# Patient Record
Sex: MALE | Race: BLACK OR AFRICAN AMERICAN | NOT HISPANIC OR LATINO | Employment: FULL TIME | URBAN - METROPOLITAN AREA
[De-identification: names, ages, dates, MRNs, and addresses within clinical notes are randomized per-mention and may not be internally consistent; named-entity substitution may affect disease eponyms.]

---

## 2022-02-25 ENCOUNTER — OFFICE VISIT (OUTPATIENT)
Dept: FAMILY MEDICINE CLINIC | Facility: CLINIC | Age: 22
End: 2022-02-25

## 2022-02-25 VITALS
SYSTOLIC BLOOD PRESSURE: 128 MMHG | WEIGHT: 271 LBS | BODY MASS INDEX: 36.7 KG/M2 | DIASTOLIC BLOOD PRESSURE: 76 MMHG | TEMPERATURE: 99.3 F | OXYGEN SATURATION: 97 % | HEIGHT: 72 IN | HEART RATE: 88 BPM

## 2022-02-25 DIAGNOSIS — M25.561 ACUTE PAIN OF RIGHT KNEE: ICD-10-CM

## 2022-02-25 DIAGNOSIS — S89.91XA INJURY OF RIGHT KNEE, INITIAL ENCOUNTER: Primary | ICD-10-CM

## 2022-02-25 DIAGNOSIS — Z76.89 ENCOUNTER TO ESTABLISH CARE: ICD-10-CM

## 2022-02-25 PROCEDURE — 99203 OFFICE O/P NEW LOW 30 MIN: CPT | Performed by: NURSE PRACTITIONER

## 2022-02-25 RX ORDER — IBUPROFEN 800 MG/1
800 TABLET ORAL EVERY 6 HOURS PRN
Qty: 60 TABLET | Refills: 0 | Status: SHIPPED | OUTPATIENT
Start: 2022-02-25

## 2022-02-25 RX ORDER — IBUPROFEN 200 MG
200 TABLET ORAL EVERY 6 HOURS PRN
COMMUNITY
End: 2022-02-25

## 2022-02-25 NOTE — PROGRESS NOTES
"Chief Complaint  new pcp and knee jerked getting out of chair month ago    Subjective          Marjorie Bailey presents to [facility]  History of Present Illness   21 yr old male presenting to establish care with complaints of right knee pain, pain increased with bending or straightening of knee, describes pain as stiffness  tearing sensation, states was getting up out of rolling chair, it malfunction and he turned quickly twisting leg, he was seen at [facility] on 01/31/2022, x-ray of right knee was negative for fracture, dislocation, or joint effusion, he was then seen on 02/10/2022 at [facility].  He has tried over-the-counter ibuprofen 400 mg, ice therapy and use of knee brace with minimal relief, will obtain MRI to r/o meniscus tear.     Objective   Vital Signs:   /76   Pulse 88   Temp 99.3 °F (37.4 °C)   Ht 182.9 cm (72\")   Wt 123 kg (271 lb)   SpO2 97%   BMI 36.75 kg/m²     Physical Exam  HENT:      Head: Normocephalic.   Cardiovascular:      Rate and Rhythm: Normal rate and regular rhythm.      Heart sounds: Normal heart sounds.   Pulmonary:      Effort: Pulmonary effort is normal.      Breath sounds: Normal breath sounds.   Musculoskeletal:      Right knee: Tenderness present over the medial joint line.   Neurological:      Mental Status: He is alert and oriented to person, place, and time.   Psychiatric:         Behavior: Behavior is cooperative.        Result Review :                 Assessment and Plan    Diagnoses and all orders for this visit:    1. Injury of right knee, initial encounter (Primary)  -     MRI Knee Right Without Contrast; Future    2. Acute pain of right knee  -     MRI Knee Right Without Contrast; Future  -     ibuprofen (ADVIL,MOTRIN) 800 MG tablet; Take 1 tablet by mouth Every 6 (Six) Hours As Needed for Mild Pain .  Dispense: 60 tablet; Refill: 0    3. Encounter to establish care      I spent 20 minutes caring for Marjorie on this " date of service. This time includes time spent by me in the following activities:reviewing tests, obtaining and/or reviewing a separately obtained history, performing a medically appropriate examination and/or evaluation , counseling and educating the patient/family/caregiver, ordering medications, tests, or procedures and documenting information in the medical record  Follow Up   Return if symptoms worsen or fail to improve.  Patient was given instructions and counseling regarding his condition or for health maintenance advice. Please see specific information pulled into the AVS if appropriate.

## 2022-02-25 NOTE — PATIENT INSTRUCTIONS
Acute Knee Pain, Adult  Acute knee pain is sudden and may be caused by damage, swelling, or irritation of the muscles and tissues that support the knee. Pain may result from:  · A fall.  · An injury to the knee from twisting motions.  · A hit to the knee.  · Infection.  Acute knee pain may go away on its own with time and rest. If it does not, your health care provider may order tests to find the cause of the pain. These may include:  · Imaging tests, such as an X-ray, MRI, CT scan, or ultrasound.  · Joint aspiration. In this test, fluid is removed from the knee and evaluated.  · Arthroscopy. In this test, a lighted tube is inserted into the knee and an image is projected onto a TV screen.  · Biopsy. In this test, a sample of tissue is removed from the body and studied under a microscope.  Follow these instructions at home:  If you have a knee sleeve or brace:    · Wear the knee sleeve or brace as told by your health care provider. Remove it only as told by your health care provider.  · Loosen it if your toes tingle, become numb, or turn cold and blue.  · Keep it clean.  · If the knee sleeve or brace is not waterproof:  ? Do not let it get wet.  ? Cover it with a watertight covering when you take a bath or shower.    Activity  · Rest your knee.  · Do not do things that cause pain or make pain worse.  · Avoid high-impact activities or exercises, such as running, jumping rope, or doing jumping jacks.  · Work with a physical therapist to make a safe exercise program, as recommended by your health care provider. Do exercises as told by your physical therapist.  Managing pain, stiffness, and swelling    · If directed, put ice on the affected knee. To do this:  ? If you have a removable knee sleeve or brace, remove it as told by your health care provider.  ? Put ice in a plastic bag.  ? Place a towel between your skin and the bag.  ? Leave the ice on for 20 minutes, 2-3 times a day.  ? Remove the ice if your skin turns  bright red. This is very important. If you cannot feel pain, heat, or cold, you have a greater risk of damage to the area.  · If directed, use an elastic bandage to put pressure (compression) on your injured knee. This may control swelling, give support, and help with discomfort.  · Raise (elevate) your knee above the level of your heart while you are sitting or lying down.  · Sleep with a pillow under your knee.    General instructions  · Take over-the-counter and prescription medicines only as told by your health care provider.  · Do not use any products that contain nicotine or tobacco, such as cigarettes, e-cigarettes, and chewing tobacco. If you need help quitting, ask your health care provider.  · If you are overweight, work with your health care provider and a dietitian to set a weight-loss goal that is healthy and reasonable for you. Extra weight can put pressure on your knee.  · Pay attention to any changes in your symptoms.  · Keep all follow-up visits. This is important.  Contact a health care provider if:  · Your knee pain continues, changes, or gets worse.  · You have a fever along with knee pain.  · Your knee feels warm to the touch or is red.  · Your knee jennifer or locks up.  Get help right away if:  · Your knee swells, and the swelling becomes worse.  · You cannot move your knee.  · You have severe pain in your knee that cannot be managed with pain medicine.  Summary  · Acute knee pain can be caused by a fall, an injury, an infection, or damage, swelling, or irritation of the tissues that support your knee.  · Your health care provider may perform tests to find out the cause of the pain.  · Pay attention to any changes in your symptoms. Relieve your pain with rest, medicines, light activity, and the use of ice.  · Get help right away if your knee swells, you cannot move your knee, or you have severe pain that cannot be managed with medicine.  This information is not intended to replace advice given  to you by your health care provider. Make sure you discuss any questions you have with your health care provider.  Document Revised: 06/02/2021 Document Reviewed: 06/02/2021  [employer] Patient Education © 2021 [employer]  Ibuprofen Oral Tablets and Capsules  What is this medicine?  IBUPROFEN (eye BYOO proe fen) is a non-steroidal anti-inflammatory drug, also known as an NSAID. It treats pain, inflammation, and swelling. It also reduces fever and minor aches and pains caused by the cold, flu, or a sore throat.  This medicine may be used for other purposes; ask your health care provider or pharmacist if you have questions.  COMMON BRAND NAME(S): Advil, Advil Seth Strength, Advil Migraine, Genpril, Ibren, IBU, Ibupak, Midol, Midol Cramps and Body Aches, Motrin, Motrin IB, Motrin Seth Strength, Motrin Migraine Pain, Mauro-8, Toxicology Saliva Collection  What should I tell my health care provider before I take this medicine?  They need to know if you have any of these conditions:  · bleeding disorder  · coronary artery bypass graft (CABG) within the past 2 weeks  · dehydration  · diarrhea  · heart attack  · heart disease  · heart failure  · high blood pressure  · if you often drink alcohol  · kidney disease  · liver disease  · lung or breathing disease (asthma)  · receiving steroids like dexamethasone or prednisone  · smoke tobacco cigarettes  · stomach bleeding  · stomach ulcers, other stomach or intestine problems  · stroke  · take drugs that treat or prevent blood clots  · vomiting  · an unusual or allergic reaction to ibuprofen, other medicines, foods, dyes, or preservatives  · pregnant or trying to get pregnant  · breast-feeding  How should I use this medicine?  Take this drug by mouth. Take it as directed on the label. You can take it with or without food. If it upsets your stomach, take it with food.  Talk to your health care provider about the use of this drug in children. While it may be prescribed for  children as young as 12 for selected conditions, precautions do apply.  Patients over 65 years of age may have a stronger reaction and need a smaller dose.  If you get this drug as a prescription, a special MedGuide will be given to you by the pharmacist with each prescription and refill. Be sure to read this information carefully each time.  Overdosage: If you think you have taken too much of this medicine contact a poison control center or emergency room at once.  NOTE: This medicine is only for you. Do not share this medicine with others.  What if I miss a dose?  If you take this drug on a regular basis, take it as soon as you can. If it is almost time for your next dose, take only that dose. Do not take double or extra doses.  What may interact with this medicine?  Do not take this medicine with any of the following medications:  · cidofovir  · ketorolac  · methotrexate  · pemetrexed  This medicine may also interact with the following medications:  · alcohol  · aspirin  · diuretics  · lithium  · other drugs for inflammation like prednisone  · warfarin  This list may not describe all possible interactions. Give your health care provider a list of all the medicines, herbs, non-prescription drugs, or dietary supplements you use. Also tell them if you smoke, drink alcohol, or use illegal drugs. Some items may interact with your medicine.  What should I watch for while using this medicine?  Visit your health care provider for regular checks on your progress. Tell your health care provider if your symptoms do not start to get better or if they get worse.  A painful sore throat or sore throat with high fevers, headaches, nausea, or vomiting may be signs of a serious infection. Call your health care provider if these symptoms occur. Do not use this medicine for more than 2 days or give to children under 3 years of age unless your health care provider tells you to.  Do not take other medicines that contain aspirin,  ibuprofen, or naproxen with this medicine. Side effects such as stomach upset, nausea, or ulcers may be more likely to occur. Many non-prescription medicines contain aspirin, ibuprofen, or naproxen. Always read labels carefully.  This medicine can cause serious ulcers and bleeding in the stomach. It can happen with no warning. Smoking, drinking alcohol, older age, and poor health can also increase risks. Call your health care provider right away if you have stomach pain or blood in your vomit or stool.  This medicine does not prevent a heart attack or stroke. This medicine may increase the chance of a heart attack or stroke. The chance may increase the longer you use this medicine or if you have heart disease. If you take aspirin to prevent a heart attack or stroke, talk to your health care provider about using this medicine.  Alcohol may interfere with the effect of this medicine. Avoid alcoholic drinks.  This medicine may cause serious skin reactions. They can happen weeks to months after starting the medicine. Contact your health care provider right away if you notice fevers or flu-like symptoms with a rash. The rash may be red or purple and then turn into blisters or peeling of the skin. Or, you might notice a red rash with swelling of the face, lips or lymph nodes in your neck or under your arms.  Talk to your health care provider if you are pregnant before taking this medicine. Taking this medicine between weeks 20 and 30 of pregnancy may harm your unborn baby. Your health care provider will monitor you closely if you need to take it. After 30 weeks of pregnancy, do not take this medicine.  You may get drowsy or dizzy. Do not drive, use machinery, or do anything that needs mental alertness until you know how this medicine affects you. Do not stand up or sit up quickly, especially if you are an older patient. This reduces the risk of dizzy or fainting spells.  Be careful brushing or flossing your teeth or using  a toothpick because you may get an infection or bleed more easily. If you have any dental work done, tell your dentist you are receiving this medicine.  This medicine may make it more difficult to get pregnant. Talk to your health care provider if you are concerned about your fertility.  What side effects may I notice from receiving this medicine?  Side effects that you should report to your doctor or health care provider as soon as possible:  · allergic reactions (skin rash, itching or hives; swelling of the face, lips, or tongue)  · aseptic meningitis (stiff neck; sensitivity to light; headache; drowsiness; fever; nausea, vomiting; rash)  · bleeding (bloody or black, tarry stools; red or dark brown urine; spitting up blood or brown material that looks like coffee grounds; red spots on the skin; unusual bruising or bleeding from the eyes, gums, or nose)  · blurred vision OR changes in vision  · heart attack (trouble breathing; pain or tightness in the chest, neck, back or arms; unusually weak or tired)  · heart failure (trouble breathing; fast, irregular heartbeat; sudden weight gain; swelling of the ankles, feet, hands; unusually weak or tired)  · high potassium levels (chest pain; fast, irregular heartbeat; muscle weakness)  · increase in blood pressure  · infection (fever, chills, cough, sore throat, pain or trouble passing urine)  · kidney injury (trouble passing urine or change in the amount of urine)  · liver injury (dark yellow or brown urine; general ill feeling or flu-like symptoms; loss of appetite, right upper belly pain; unusually weak or tired, yellowing of the eyes or skin)  · low blood pressure (dizziness; feeling faint or lightheaded, falls; unusually weak or tired)  · low red blood cell counts (trouble breathing; feeling faint; lightheaded, falls; unusually weak or tired)  · rash, fever, and swollen lymph nodes  · redness, blistering, peeling, or loosening of the skin, including inside the  mouth  · stroke (changes in vision; confusion; trouble speaking or understanding; severe headaches; sudden numbness or weakness of the face, arm or leg; trouble walking; dizziness; loss of balance or coordination)  Side effects that usually do not require medical attention (report to your doctor or health care provider if they continue or are bothersome):  · cough  · constipation  · diarrhea  · dizziness  · headache  · upset stomach  · vomiting  This list may not describe all possible side effects. Call your doctor for medical advice about side effects. You may report side effects to FDA at 4-955-UBR-2823.  Where should I keep my medicine?  Keep out of the reach of children and pets.  Store at room temperature between 20 and 25 degrees C (68 and 77 degrees F).  Get rid of any unused medicine after the expiration date.  To get rid of medicines that are no longer needed or have :  · Take the medicine to a medicine take-back program. Check with your pharmacy or law enforcement to find a location.  · If you cannot return the medicine, check the label or package insert to see if the medicine should be thrown out in the garbage or flushed down the toilet. If you are not sure, ask your health care provider. If it is safe to put it in the trash, empty the medicine out of the container. Mix the medicine with cat litter, dirt, coffee grounds, or other unwanted substance. Seal the mixture in a bag or container. Put it in the trash.  NOTE: This sheet is a summary. It may not cover all possible information. If you have questions about this medicine, talk to your doctor, pharmacist, or health care provider.  ©  Elsevier/Gold Standard (2021 12:06:36)

## 2022-03-06 ENCOUNTER — HOSPITAL ENCOUNTER (OUTPATIENT)
Dept: MRI IMAGING | Facility: HOSPITAL | Age: 22
Discharge: HOME | End: 2022-03-06
Admitting: NURSE PRACTITIONER

## 2022-03-06 DIAGNOSIS — M25.561 ACUTE PAIN OF RIGHT KNEE: ICD-10-CM

## 2022-03-06 DIAGNOSIS — S89.91XA INJURY OF RIGHT KNEE, INITIAL ENCOUNTER: ICD-10-CM

## 2022-03-06 PROCEDURE — 73721 MRI JNT OF LWR EXTRE W/O DYE: CPT

## 2022-03-09 ENCOUNTER — TELEPHONE (OUTPATIENT)
Dept: FAMILY MEDICINE CLINIC | Facility: CLINIC | Age: 22
End: 2022-03-09

## 2022-03-09 DIAGNOSIS — S83.211A BUCKET-HANDLE TEAR OF MEDIAL MENISCUS OF RIGHT KNEE AS CURRENT INJURY, INITIAL ENCOUNTER: Primary | ICD-10-CM

## 2022-03-09 NOTE — TELEPHONE ENCOUNTER
Caller: Marjorie Bailey    Relationship: Self    Best call back number: [phone]    What was the call regarding: PLEASE CALL REGARDING MRI RESULTS    Do you require a callback: YES

## 2022-03-28 ENCOUNTER — OFFICE VISIT (OUTPATIENT)
Dept: ORTHOPEDIC SURGERY | Facility: CLINIC | Age: 22
End: 2022-03-28

## 2022-03-28 VITALS — TEMPERATURE: 96.2 F | WEIGHT: 273.6 LBS | HEIGHT: 72 IN | BODY MASS INDEX: 37.06 KG/M2

## 2022-03-28 DIAGNOSIS — R52 PAIN: Primary | ICD-10-CM

## 2022-03-28 DIAGNOSIS — S83.211A BUCKET-HANDLE TEAR OF MEDIAL MENISCUS OF RIGHT KNEE AS CURRENT INJURY, INITIAL ENCOUNTER: ICD-10-CM

## 2022-03-28 PROCEDURE — 73562 X-RAY EXAM OF KNEE 3: CPT | Performed by: ORTHOPAEDIC SURGERY

## 2022-03-28 PROCEDURE — 99204 OFFICE O/P NEW MOD 45 MIN: CPT | Performed by: ORTHOPAEDIC SURGERY

## 2022-03-28 RX ORDER — CEFAZOLIN SODIUM 2 G/100ML
2 INJECTION, SOLUTION INTRAVENOUS ONCE
Status: CANCELLED | OUTPATIENT
Start: 2022-04-11 | End: 2022-03-28

## 2022-03-29 PROBLEM — S83.211A BUCKET-HANDLE TEAR OF MEDIAL MENISCUS OF RIGHT KNEE AS CURRENT INJURY: Status: ACTIVE | Noted: 2022-03-29

## 2022-04-08 ENCOUNTER — LAB (OUTPATIENT)
Dept: LAB | Facility: HOSPITAL | Age: 22
End: 2022-04-08

## 2022-04-08 DIAGNOSIS — S83.211A BUCKET-HANDLE TEAR OF MEDIAL MENISCUS OF RIGHT KNEE AS CURRENT INJURY, INITIAL ENCOUNTER: ICD-10-CM

## 2022-04-08 LAB — SARS-COV-2 ORF1AB RESP QL NAA+PROBE: NOT DETECTED

## 2022-04-08 PROCEDURE — U0004 COV-19 TEST NON-CDC HGH THRU: HCPCS

## 2022-04-08 PROCEDURE — C9803 HOPD COVID-19 SPEC COLLECT: HCPCS

## 2022-04-11 ENCOUNTER — ANESTHESIA (OUTPATIENT)
Dept: PERIOP | Facility: HOSPITAL | Age: 22
End: 2022-04-11

## 2022-04-11 ENCOUNTER — HOSPITAL ENCOUNTER (OUTPATIENT)
Facility: HOSPITAL | Age: 22
Setting detail: HOSPITAL OUTPATIENT SURGERY
Discharge: HOME | End: 2022-04-11
Attending: ORTHOPAEDIC SURGERY | Admitting: ORTHOPAEDIC SURGERY

## 2022-04-11 ENCOUNTER — ANESTHESIA EVENT (OUTPATIENT)
Dept: PERIOP | Facility: HOSPITAL | Age: 22
End: 2022-04-11

## 2022-04-11 VITALS
OXYGEN SATURATION: 98 % | DIASTOLIC BLOOD PRESSURE: 92 MMHG | SYSTOLIC BLOOD PRESSURE: 121 MMHG | TEMPERATURE: 98.8 F | HEART RATE: 56 BPM | RESPIRATION RATE: 18 BRPM

## 2022-04-11 DIAGNOSIS — S83.211A BUCKET-HANDLE TEAR OF MEDIAL MENISCUS OF RIGHT KNEE AS CURRENT INJURY, INITIAL ENCOUNTER: ICD-10-CM

## 2022-04-11 PROCEDURE — 25010000002 HYDROMORPHONE PER 4 MG: Performed by: NURSE ANESTHETIST, CERTIFIED REGISTERED

## 2022-04-11 PROCEDURE — 25010000002 DEXAMETHASONE PER 1 MG: Performed by: NURSE ANESTHETIST, CERTIFIED REGISTERED

## 2022-04-11 PROCEDURE — 29881 ARTHRS KNE SRG MNISECTMY M/L: CPT | Performed by: ORTHOPAEDIC SURGERY

## 2022-04-11 PROCEDURE — 25010000002 METHYLPREDNISOLONE PER 80 MG: Performed by: ORTHOPAEDIC SURGERY

## 2022-04-11 PROCEDURE — 25010000002 PROPOFOL 10 MG/ML EMULSION: Performed by: NURSE ANESTHETIST, CERTIFIED REGISTERED

## 2022-04-11 PROCEDURE — 25010000002 ONDANSETRON PER 1 MG: Performed by: NURSE ANESTHETIST, CERTIFIED REGISTERED

## 2022-04-11 PROCEDURE — 25010000002 CEFAZOLIN IN DEXTROSE 2-4 GM/100ML-% SOLUTION: Performed by: ORTHOPAEDIC SURGERY

## 2022-04-11 PROCEDURE — 25010000002 FENTANYL CITRATE (PF) 50 MCG/ML SOLUTION: Performed by: NURSE ANESTHETIST, CERTIFIED REGISTERED

## 2022-04-11 PROCEDURE — 25010000002 KETOROLAC TROMETHAMINE PER 15 MG: Performed by: NURSE ANESTHETIST, CERTIFIED REGISTERED

## 2022-04-11 RX ORDER — ONDANSETRON 2 MG/ML
INJECTION INTRAMUSCULAR; INTRAVENOUS AS NEEDED
Status: DISCONTINUED | OUTPATIENT
Start: 2022-04-11 | End: 2022-04-11 | Stop reason: SURG

## 2022-04-11 RX ORDER — MIDAZOLAM HYDROCHLORIDE 1 MG/ML
1 INJECTION INTRAMUSCULAR; INTRAVENOUS
Status: DISCONTINUED | OUTPATIENT
Start: 2022-04-11 | End: 2022-04-11 | Stop reason: HOSPADM

## 2022-04-11 RX ORDER — HYDROCODONE BITARTRATE AND ACETAMINOPHEN 5; 325 MG/1; MG/1
1 TABLET ORAL ONCE AS NEEDED
Status: DISCONTINUED | OUTPATIENT
Start: 2022-04-11 | End: 2022-04-11 | Stop reason: HOSPADM

## 2022-04-11 RX ORDER — FENTANYL CITRATE 50 UG/ML
INJECTION, SOLUTION INTRAMUSCULAR; INTRAVENOUS AS NEEDED
Status: DISCONTINUED | OUTPATIENT
Start: 2022-04-11 | End: 2022-04-11 | Stop reason: SURG

## 2022-04-11 RX ORDER — HYDROCODONE BITARTRATE AND ACETAMINOPHEN 7.5; 325 MG/1; MG/1
1 TABLET ORAL EVERY 4 HOURS PRN
Status: DISCONTINUED | OUTPATIENT
Start: 2022-04-11 | End: 2022-04-11 | Stop reason: HOSPADM

## 2022-04-11 RX ORDER — PROPOFOL 10 MG/ML
VIAL (ML) INTRAVENOUS AS NEEDED
Status: DISCONTINUED | OUTPATIENT
Start: 2022-04-11 | End: 2022-04-11 | Stop reason: SURG

## 2022-04-11 RX ORDER — HYDROCODONE BITARTRATE AND ACETAMINOPHEN 5; 325 MG/1; MG/1
1 TABLET ORAL EVERY 6 HOURS PRN
Qty: 25 TABLET | Refills: 0 | Status: SHIPPED | OUTPATIENT
Start: 2022-04-11

## 2022-04-11 RX ORDER — FLUMAZENIL 0.1 MG/ML
0.2 INJECTION INTRAVENOUS AS NEEDED
Status: DISCONTINUED | OUTPATIENT
Start: 2022-04-11 | End: 2022-04-11 | Stop reason: HOSPADM

## 2022-04-11 RX ORDER — LIDOCAINE HYDROCHLORIDE 10 MG/ML
0.5 INJECTION, SOLUTION EPIDURAL; INFILTRATION; INTRACAUDAL; PERINEURAL ONCE AS NEEDED
Status: DISCONTINUED | OUTPATIENT
Start: 2022-04-11 | End: 2022-04-11 | Stop reason: HOSPADM

## 2022-04-11 RX ORDER — ACETAMINOPHEN 325 MG/1
650 TABLET ORAL ONCE
Status: COMPLETED | OUTPATIENT
Start: 2022-04-11 | End: 2022-04-11

## 2022-04-11 RX ORDER — SODIUM CHLORIDE 0.9 % (FLUSH) 0.9 %
10 SYRINGE (ML) INJECTION EVERY 12 HOURS SCHEDULED
Status: DISCONTINUED | OUTPATIENT
Start: 2022-04-11 | End: 2022-04-11 | Stop reason: HOSPADM

## 2022-04-11 RX ORDER — LIDOCAINE HYDROCHLORIDE 20 MG/ML
INJECTION, SOLUTION INFILTRATION; PERINEURAL AS NEEDED
Status: DISCONTINUED | OUTPATIENT
Start: 2022-04-11 | End: 2022-04-11 | Stop reason: SURG

## 2022-04-11 RX ORDER — CEFAZOLIN SODIUM 2 G/100ML
2 INJECTION, SOLUTION INTRAVENOUS ONCE
Status: COMPLETED | OUTPATIENT
Start: 2022-04-11 | End: 2022-04-11

## 2022-04-11 RX ORDER — HYDROMORPHONE HYDROCHLORIDE 1 MG/ML
0.5 INJECTION, SOLUTION INTRAMUSCULAR; INTRAVENOUS; SUBCUTANEOUS
Status: DISCONTINUED | OUTPATIENT
Start: 2022-04-11 | End: 2022-04-11 | Stop reason: HOSPADM

## 2022-04-11 RX ORDER — DEXMEDETOMIDINE HYDROCHLORIDE 100 UG/ML
INJECTION, SOLUTION INTRAVENOUS AS NEEDED
Status: DISCONTINUED | OUTPATIENT
Start: 2022-04-11 | End: 2022-04-11 | Stop reason: SURG

## 2022-04-11 RX ORDER — GLYCOPYRROLATE 0.2 MG/ML
0.2 INJECTION INTRAMUSCULAR; INTRAVENOUS
Status: COMPLETED | OUTPATIENT
Start: 2022-04-11 | End: 2022-04-11

## 2022-04-11 RX ORDER — SODIUM CHLORIDE, SODIUM LACTATE, POTASSIUM CHLORIDE, CALCIUM CHLORIDE 600; 310; 30; 20 MG/100ML; MG/100ML; MG/100ML; MG/100ML
9 INJECTION, SOLUTION INTRAVENOUS CONTINUOUS PRN
Status: DISCONTINUED | OUTPATIENT
Start: 2022-04-11 | End: 2022-04-11 | Stop reason: HOSPADM

## 2022-04-11 RX ORDER — ONDANSETRON 2 MG/ML
4 INJECTION INTRAMUSCULAR; INTRAVENOUS ONCE AS NEEDED
Status: DISCONTINUED | OUTPATIENT
Start: 2022-04-11 | End: 2022-04-11 | Stop reason: HOSPADM

## 2022-04-11 RX ORDER — KETOROLAC TROMETHAMINE 30 MG/ML
INJECTION, SOLUTION INTRAMUSCULAR; INTRAVENOUS AS NEEDED
Status: DISCONTINUED | OUTPATIENT
Start: 2022-04-11 | End: 2022-04-11 | Stop reason: SURG

## 2022-04-11 RX ORDER — HYDROMORPHONE HCL 110MG/55ML
PATIENT CONTROLLED ANALGESIA SYRINGE INTRAVENOUS AS NEEDED
Status: DISCONTINUED | OUTPATIENT
Start: 2022-04-11 | End: 2022-04-11 | Stop reason: SURG

## 2022-04-11 RX ORDER — SODIUM CHLORIDE, SODIUM LACTATE, POTASSIUM CHLORIDE, AND CALCIUM CHLORIDE .6; .31; .03; .02 G/100ML; G/100ML; G/100ML; G/100ML
IRRIGANT IRRIGATION AS NEEDED
Status: DISCONTINUED | OUTPATIENT
Start: 2022-04-11 | End: 2022-04-11 | Stop reason: HOSPADM

## 2022-04-11 RX ORDER — EPHEDRINE SULFATE 50 MG/ML
5 INJECTION, SOLUTION INTRAVENOUS ONCE AS NEEDED
Status: DISCONTINUED | OUTPATIENT
Start: 2022-04-11 | End: 2022-04-11 | Stop reason: HOSPADM

## 2022-04-11 RX ORDER — FENTANYL CITRATE 50 UG/ML
50 INJECTION, SOLUTION INTRAMUSCULAR; INTRAVENOUS
Status: DISCONTINUED | OUTPATIENT
Start: 2022-04-11 | End: 2022-04-11 | Stop reason: HOSPADM

## 2022-04-11 RX ORDER — DEXAMETHASONE SODIUM PHOSPHATE 10 MG/ML
INJECTION INTRAMUSCULAR; INTRAVENOUS AS NEEDED
Status: DISCONTINUED | OUTPATIENT
Start: 2022-04-11 | End: 2022-04-11 | Stop reason: SURG

## 2022-04-11 RX ORDER — SODIUM CHLORIDE 0.9 % (FLUSH) 0.9 %
10 SYRINGE (ML) INJECTION AS NEEDED
Status: DISCONTINUED | OUTPATIENT
Start: 2022-04-11 | End: 2022-04-11 | Stop reason: HOSPADM

## 2022-04-11 RX ADMIN — GLYCOPYRROLATE 0.2 MG: 0.2 INJECTION INTRAMUSCULAR; INTRAVENOUS at 08:05

## 2022-04-11 RX ADMIN — DEXMEDETOMIDINE 8 MCG: 100 INJECTION, SOLUTION, CONCENTRATE INTRAVENOUS at 10:09

## 2022-04-11 RX ADMIN — KETOROLAC TROMETHAMINE 30 MG: 30 INJECTION, SOLUTION INTRAMUSCULAR at 10:23

## 2022-04-11 RX ADMIN — PROPOFOL 230 MG: 10 INJECTION, EMULSION INTRAVENOUS at 09:43

## 2022-04-11 RX ADMIN — DEXMEDETOMIDINE 4 MCG: 100 INJECTION, SOLUTION, CONCENTRATE INTRAVENOUS at 10:23

## 2022-04-11 RX ADMIN — DEXAMETHASONE SODIUM PHOSPHATE 10 MG: 10 INJECTION INTRAMUSCULAR; INTRAVENOUS at 09:48

## 2022-04-11 RX ADMIN — FENTANYL CITRATE 50 MCG: 50 INJECTION INTRAMUSCULAR; INTRAVENOUS at 10:09

## 2022-04-11 RX ADMIN — SODIUM CHLORIDE, POTASSIUM CHLORIDE, SODIUM LACTATE AND CALCIUM CHLORIDE 9 ML/HR: 600; 310; 30; 20 INJECTION, SOLUTION INTRAVENOUS at 07:54

## 2022-04-11 RX ADMIN — ONDANSETRON 4 MG: 2 INJECTION INTRAMUSCULAR; INTRAVENOUS at 10:28

## 2022-04-11 RX ADMIN — ACETAMINOPHEN 650 MG: 325 TABLET, FILM COATED ORAL at 07:55

## 2022-04-11 RX ADMIN — HYDROMORPHONE HYDROCHLORIDE 0.5 MG: 2 INJECTION, SOLUTION INTRAMUSCULAR; INTRAVENOUS; SUBCUTANEOUS at 10:28

## 2022-04-11 RX ADMIN — FENTANYL CITRATE 25 MCG: 50 INJECTION INTRAMUSCULAR; INTRAVENOUS at 10:13

## 2022-04-11 RX ADMIN — HYDROMORPHONE HYDROCHLORIDE 0.5 MG: 2 INJECTION, SOLUTION INTRAMUSCULAR; INTRAVENOUS; SUBCUTANEOUS at 10:20

## 2022-04-11 RX ADMIN — LIDOCAINE HYDROCHLORIDE 100 MG: 20 INJECTION, SOLUTION INFILTRATION; PERINEURAL at 09:43

## 2022-04-11 RX ADMIN — FENTANYL CITRATE 25 MCG: 50 INJECTION INTRAMUSCULAR; INTRAVENOUS at 09:48

## 2022-04-11 RX ADMIN — CEFAZOLIN SODIUM 2 G: 2 INJECTION, SOLUTION INTRAVENOUS at 09:35

## 2022-04-11 RX ADMIN — DEXMEDETOMIDINE 8 MCG: 100 INJECTION, SOLUTION, CONCENTRATE INTRAVENOUS at 09:46

## 2022-04-11 RX ADMIN — DEXMEDETOMIDINE 8 MCG: 100 INJECTION, SOLUTION, CONCENTRATE INTRAVENOUS at 09:48

## 2022-04-11 NOTE — ANESTHESIA POSTPROCEDURE EVALUATION
Patient: Marjorie Bailey    Procedure Summary     Date: 04/11/22 Room / Location:  [facility]    Anesthesia Start: 0938 Anesthesia Stop: 1043    Procedure: KNEE ARTHROSCOPY  PARTIAL MEDIAL MENISECTOMY (Right Knee) Diagnosis:       Bucket-handle tear of medial meniscus of right knee as current injury, initial encounter      (Bucket-handle tear of medial meniscus of right knee as current injury, initial encounter [S83.211A])    Surgeons: Destiny Wilson MD Provider: Betito Irving MD    Anesthesia Type: general ASA Status: 3          Anesthesia Type: general    Vitals  Vitals Value Taken Time   /80 04/11/22 1216   Temp 37.1 °C (98.8 °F) 04/11/22 1215   Pulse 50 04/11/22 1225   Resp 20 04/11/22 1215   SpO2 97 % 04/11/22 1225   Vitals shown include unvalidated device data.        Post Anesthesia Care and Evaluation    Patient location during evaluation: bedside  Patient participation: complete - patient participated  Level of consciousness: awake  Pain management: adequate  Airway patency: patent  Anesthetic complications: No anesthetic complications  PONV Status: controlled  Cardiovascular status: acceptable  Respiratory status: acceptable  Hydration status: acceptable    Comments: --------------------            04/11/22               1237     --------------------   BP:       121/92     Pulse:      56       Resp:       18       Temp:                SpO2:      98%      --------------------

## 2022-04-11 NOTE — ANESTHESIA PROCEDURE NOTES
Airway  Urgency: elective    Date/Time: 4/11/2022 9:45 AM    General Information and Staff    Patient location during procedure: OR  CRNA: Clemencia Covarrubias CRNA    Indications and Patient Condition  Indications for airway management: airway protection    Preoxygenated: yes  Mask difficulty assessment: 0 - not attempted    Final Airway Details  Final airway type: supraglottic airway      Successful airway: unique  Size 5    Number of attempts at approach: 1  Assessment: lips, teeth, and gum same as pre-op and atraumatic intubation

## 2022-04-11 NOTE — ANESTHESIA PREPROCEDURE EVALUATION
Anesthesia Evaluation     Patient summary reviewed and Nursing notes reviewed   NPO Solid Status: > 8 hours             Airway   Mallampati: II  TM distance: >3 FB  Neck ROM: full  no difficulty expected  Dental - normal exam     Pulmonary - normal exam   (+) a smoker Current, asthma,  Cardiovascular - negative cardio ROS and normal exam        Neuro/Psych- negative ROS  GI/Hepatic/Renal/Endo    (+) obesity,       Musculoskeletal (-) negative ROS    Abdominal  - normal exam   Substance History - negative use     OB/GYN negative ob/gyn ROS         Other        ROS/Med Hx Other: Father  at 22 due to an MI  Patient has no cardiovascular symptoms                Anesthesia Plan    ASA 3     general   (There were no vitals filed for this visit.)  intravenous induction     Anesthetic plan, all risks, benefits, and alternatives have been provided, discussed and informed consent has been obtained with: patient.    Plan discussed with CRNA.        CODE STATUS:

## 2022-04-11 NOTE — H&P
History & Physical       Patient: Marjorie Bailey    Date of Admission: 4/11/2022  7:01 AM    YOB: 2000    Medical Record Number: [id number]    Attending Physician: Destiny Wilson MD        Chief Complaints: Bucket-handle tear of medial meniscus of right knee as current injury, initial encounter [S83.211A]      History of Present Illness: This patient has a several month history of right knee pain with loss of terminal extension for the last 3 months he has an MRI which shows a large bucket-handle tear of the medial meniscus he presents for arthroscopy.  If this is repairable we will repair it but since it has been out for 3 months the chance of that is a little less likely     Allergies: No Known Allergies    Medications:   Home Medications:  No current facility-administered medications on file prior to encounter.     Current Outpatient Medications on File Prior to Encounter   Medication Sig   • ibuprofen (ADVIL,MOTRIN) 800 MG tablet Take 1 tablet by mouth Every 6 (Six) Hours As Needed for Mild Pain .     Current Medications:  Scheduled Meds:acetaminophen, 650 mg, Oral, Once  ceFAZolin, 2 g, Intravenous, Once  sodium chloride, 10 mL, Intravenous, Q12H      Continuous Infusions:lactated ringers, 9 mL/hr      PRN Meds:.lactated ringers  •  lidocaine PF 1%  •  midazolam  •  sodium chloride    Past Medical History:   Diagnosis Date   • Allergies    • Asthma     AS A CHILD   • Right knee meniscal tear         Past Surgical History:   Procedure Laterality Date   • NO PAST SURGERIES          Social History     Occupational History   • Not on file   Tobacco Use   • Smoking status: Current Every Day Smoker     Packs/day: 0.25     Types: Cigarettes   • Smokeless tobacco: Never Used   • Tobacco comment: black and mild   Vaping Use   • Vaping Use: Never used   Substance and Sexual Activity   • Alcohol use: Not Currently     Comment: OCCASIONAL   • Drug use: Yes     Types: Marijuana     Comment: OCCASIONAL    • Sexual activity: Defer      Social History     Social History Narrative   • Not on file        Family History   Problem Relation Age of Onset   • Heart disease Father    • Sickle cell anemia Sister    • Sickle cell anemia Sister    • Malig Hyperthermia Neg Hx        Review of Systems      Physical Exam: 21 y.o. male  General Appearance:    Alert, cooperative, in no acute distress                      Vitals:    04/11/22 0721   Temp: 98.2 °F (36.8 °C)   TempSrc: Oral        Head:  Normocephalic, without obvious abnormality, atraumatic   Eyes:          Conjunctivae and sclerae normal, no pallor, corneas clear,    Ears:  Ears appear intact with no abnormalities noted   Throat: No oral lesions, no thrush, oral mucosa moist   Neck: No adenopathy, supple, trachea midline, no thyromegaly,    Back:   No kyphosis present, no scoliosis present, no skin lesions,      erythema or scars, no tenderness to percussion or                   palpation,range of motion normal   Lungs:   Clear to auscultation, respirations regular, even and                 unlabored    Heart:  Regular rhythm and normal rate               Chest Wall:  No abnormalities observed   Abdomen:   Normal bowel sounds, no masses, no organomegaly, soft    nontender, nondistended, no guarding, no rebound   tenderness   Rectal:   Deferred   Extremities:  Moves all extremities well, no edema,   no cyanosis, no redness   Pulses: Pulses palpable and equal bilaterally   Skin: No bleeding, bruising or rash   Lymph nodes: No palpable adenopathy   Neurologic: Appears neurologic intact             Assessment:  Patient Active Problem List   Diagnosis   • Bucket-handle tear of medial meniscus of right knee as current injury           Plan: All risks, benefits and alternatives were discussed.  Risks including but not exclusive to anesthetic complications, including death, MI, CVA, infection, bleeding DVT, PE,  fracture, residual pain and need for future surgery.  Patient  understood all and agrees to proceed.

## 2022-04-11 NOTE — OP NOTE
Operative Note      Facility: [facility]  Patient Name: Marjorie Bailey  YOB: 2000  Date: 4/11/2022  Medical Record Number: [id number]      Pre-op Diagnosis:   Bucket-handle tear of medial meniscus of right knee as current injury, initial encounter [S83.211A]    Post-Op Diagnosis Codes:     * Bucket-handle tear of medial meniscus of right knee as current injury, initial encounter [S83.211A]    Procedure(s):  Right KNEE ARTHROSCOPY  PARTIAL MEDIAL MENISECTOMY    Surgeon(s):  Destiny Wilson MD    Anesthesia: General  Anesthesiologist: Betito Irving MD  CRNA: Clemencia Covarrubias CRNA    Staff:   Circulator: Areli Evans RN  Scrub Person: Gisella Reilly  Vendor Representative: Teresa Enriquez  Other: Patience Barrios RN    Assistants : none      Estimated Blood Loss: 5 mL    Specimens:    none     Drains: None    Findings: See Dictation    Complications: None      Indication for procedure: This patient has had a several month history of knee pain and has an exam and an MRI which are consistent with meniscal pathology. They understand all options and wish to proceed with arthroscopy.      Description of procedure: The patient was taken to the operating room. They were placed supine on the operating room table. After induction of adequate LMA anesthesia, IV antibiotics the patient underwent exam under anesthesia with symmetric full range of motion. Nonsterile tourniquet was applied patient was placed in the thigh mitchell all prominent areas were well padded and end of the table dropped. The leg was prepped and draped in usual sterile fashion. Standard lateral incision was made with 11 blade. Blunt trocar penetrated into the joint, scope followed and the evaluation began.  The patella appeared to sit centrally within the trochlear groove the cartilage was intact on both gutters were normal then entered the medial compartment under spinal needle localization direct visualization a medial  portals established he had an obvious bucket-handle tear of the medial meniscus that had been bucketed since January.  I established a medial portal with spinal needle localization direct visualization and evaluated the meniscus.  It was bucketed but also very beat up had a complex tear with a large horizontal cleavage component as well.  I did not feel it was repairable.  I resected it from the posterior aspect and in the anterior aspect and removed it.  Inspection of it on the table demonstrated a macerated fragment.  The cartilage was intact on the femur and the tibia notch was then normal lateral compartment was normal            At this point everything was thoroughly irrigated it was suctioned all 3 compartments, the gutters the suprapatellar pouch were all evaluated there was no further acute pathology seen.  Everything was thoroughly irrigated it was injected with Marcaine Depo-Medrol.  I put some Depo-Medrol in there because he did have some synovitis associated with his pathology.  The portals were closed with 3-0 nylon interrupted fashion.  Sterile dressings and Ace wraps were applied.  The patient tolerated the procedure well and was taken to recovery room in good condition.  All sponge and needle counts were correct.                    Date: 4/11/2022  Time: 10:54 EDT

## 2022-04-25 ENCOUNTER — OFFICE VISIT (OUTPATIENT)
Dept: ORTHOPEDIC SURGERY | Facility: CLINIC | Age: 22
End: 2022-04-25

## 2022-04-25 VITALS — HEIGHT: 72 IN | TEMPERATURE: 97.5 F | WEIGHT: 279 LBS | BODY MASS INDEX: 37.79 KG/M2

## 2022-04-25 DIAGNOSIS — Z98.890 S/P ARTHROSCOPY OF KNEE: Primary | ICD-10-CM

## 2022-04-25 PROCEDURE — 99024 POSTOP FOLLOW-UP VISIT: CPT | Performed by: ORTHOPAEDIC SURGERY

## 2022-04-25 NOTE — PROGRESS NOTES
Knee Scope follow Up 1st Visit      Patient: Marjorie Bailey        YOB: 2000      Chief Complaints: knee pain right      History of Present Illness: Pt is here f/u knee arthroscopy of the right knee he states he is doing good states his knee feels much better he is progressing slowly per our request        Allergies: No Known Allergies    Medications:   Home Medications:  Current Outpatient Medications on File Prior to Visit   Medication Sig   • HYDROcodone-acetaminophen (NORCO) 5-325 MG per tablet Take 1 tablet by mouth Every 6 (Six) Hours As Needed for Severe Pain .   • ibuprofen (ADVIL,MOTRIN) 800 MG tablet Take 1 tablet by mouth Every 6 (Six) Hours As Needed for Mild Pain .     No current facility-administered medications on file prior to visit.     Current Medications:  Scheduled Meds:  Continuous Infusions:No current facility-administered medications for this visit.    PRN Meds:.          Physical Exam: 21 y.o. male  General Appearance:    Alert, cooperative, in no acute distress                 There were no vitals filed for this visit.   Patient is alert and oriented ×3 no acute distress normal mood physical exam.  Physical exam of the knee, incisions looked good there is no erythema, calf is soft and non-tender.  No sign or sx of DVT      Assessment  S/P knee scope.  I did review intraoperative findings and arthroscopic pictures with the patient.          Plan: To remove sutures today place Steri-Strips and start into  physical therapy and I will have thrm follow up in 4 weeks.

## 2022-04-27 ENCOUNTER — TREATMENT (OUTPATIENT)
Dept: PHYSICAL THERAPY | Facility: CLINIC | Age: 22
End: 2022-04-27

## 2022-04-27 DIAGNOSIS — Z47.89 ORTHOPEDIC AFTERCARE: ICD-10-CM

## 2022-04-27 DIAGNOSIS — Z98.890 S/P ARTHROSCOPIC PARTIAL MEDIAL MENISCECTOMY: Primary | ICD-10-CM

## 2022-04-27 PROCEDURE — 97110 THERAPEUTIC EXERCISES: CPT | Performed by: PHYSICAL THERAPIST

## 2022-04-27 PROCEDURE — 97162 PT EVAL MOD COMPLEX 30 MIN: CPT | Performed by: PHYSICAL THERAPIST

## 2022-04-27 PROCEDURE — 97530 THERAPEUTIC ACTIVITIES: CPT | Performed by: PHYSICAL THERAPIST

## 2022-04-27 NOTE — PROGRESS NOTES
Physical Therapy Initial Evaluation and Plan of Care    Patient: Marjorie Bailey   : 2000  Diagnosis/ICD-10 Code:  S/P arthroscopic partial medial meniscectomy [Z98.890]  Referring practitioner: Destiny Wilson MD  Date of Initial Visit: 2022  Today's Date: 2022  Patient seen for 1 session         Visit Diagnoses:    ICD-10-CM ICD-9-CM   1. S/P arthroscopic partial medial meniscectomy  Z98.890 V45.89   2. Orthopedic aftercare  Z47.89 V54.9         Subjective Questionnaire: LEFS: 38/80      Subjective Evaluation    History of Present Illness  Date of onset: 2022  Date of surgery: 2022  Mechanism of injury: 21 year old s/p scope R bucket handle tear 22 after injury on 22 getting out of a reclining chair. Positive MRI on 3-6-22 verified large bucket handle tear.   Does feel that he may have originally hurt his knee last Fall jumping 40x trying to touch a backboard.  No other prior R knee injury although active playing football in [place].   Sutures out on 22 and now with steri strips. 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 Back to shooting basketball and agrees not to play until next ortho follow up.   Does have [employer] membership and will take our advice to go back slowly.       Patient Occupation: food delivery Quality of life: excellent    Pain  Current pain ratin  At best pain ratin  At worst pain ratin  Location: lateral femoral condyle  Quality: sharp  Relieving factors: rest  Aggravating factors: ambulation  Progression: improved    Diagnostic Tests  X-ray: abnormal  MRI studies: abnormal    Treatments  No previous or current treatments  Patient Goals  Patient goals for therapy: decreased edema, decreased pain, increased motion, improved balance, increased strength and return to sport/leisure activities             Objective          Static Posture     Pelvis   Anterior pelvic tilt    Knee   Genu valgus.   Knee (Left): Recurvatum.    Knee (Right): Recurvatum.     Ankle/Foot   Ankle/Foot (Left): Pes planus.   Ankle/Foot (Right): Pes planus.     Observations     Right Knee   Positive for incision. Negative for drainage.     Additional Knee Observation Details  Band aid added back over steri strip that had come away from skin too early at this point. 2 of 3 scope incisions held well with steri strip but one area needs better closure.     Tenderness     Right Knee   Tenderness in the lateral patella and lateral retinaculum.     Active Range of Motion   Left Knee   Normal active range of motion  Flexion: 144 degrees   Extension: 0 degrees     Right Knee   Flexion: 136 degrees   Extension: 0 degrees     Additional Active Range of Motion Details  Hyperextension 5 on R and 10 degrees L    Strength/Myotome Testing     Left Knee   Normal strength  Quadriceps contraction: good    Right Knee   Flexion: 4-  Extension: 4-  Quadriceps contraction: good    Swelling     Left Knee Girth Measurement (cm)   Joint line: 39 cm    Right Knee Girth Measurement (cm)   Joint line: 41 cm    Ambulation   Weight-Bearing Status   Assistive device used: none    Ambulation: Level Surfaces   Ambulation without assistive device: independent    Ambulation: Stairs   Ascend stairs: independent  Pattern: reciprocal  Railings: without rails  Descend stairs: independent  Pattern: reciprocal  Railings: without rails    Observational Gait   Left stance time and right stance time within functional limits. Decreased walking speed.   Right foot contact pattern: heel to toe  Base of support: increased    Quality of Movement During Gait     Pelvis  Anterior pelvic tilt.     Knee    Knee (Left): Positive recurvatum and valgus.   Knee (Right): Positive recurvatum and valgus.     Ankle    Ankle (Left): Positive pronated.   Ankle (Right): Positive pronated.     Foot Alignment    Foot Alignment (Left): Positive flat foot.   Foot Alignment (Right): Positive flat foot.     Additional Quality of  "Movement During Gait Details  Severe flat feet L > R with valgus and recurvatum    Functional Assessment     Forward Step Up 6\"   Left Leg  Within functional limits.     Right Leg  Pain.     Single Leg Stance   Left: 20 seconds  Right: 30 seconds          Assessment & Plan     Assessment  Impairments: abnormal gait, abnormal or restricted ROM, activity intolerance, impaired balance, impaired physical strength, lacks appropriate home exercise program, pain with function and weight-bearing intolerance  Functional Limitations: carrying objects, lifting, walking, pulling, pushing and uncomfortable because of pain  Assessment details: Pt with stable condition happy with outcome so far. Lots of questions regarding prognosis for possible arthritis down the road. Explained need for wt loss, activities with lower load WB like biking vs. Running. Pt with co-morbidity of significant obesity at 279#, long hx tendency to  recurvatum. Pt very aware of this habit and did well adapting to new plan to soften knees and keep core tight. Extreme pes planus affecting knees and will obtain arch supports asap.  Personal factor of being able to work as needed and has done 70 hour weeks in the past. Has access to a gym and will advise him on do's and don'ts. Has already started back to squatting and advised to use TRX squats and low load leg press instead.  Pt very active and energetic and needs skilled PT to make sure he doesn't resume some activities too soon.   Prognosis: good  Prognosis details: Access Code: [email or web address]  URL: [email or web address]  Date: 04/27/2022  Prepared by: Zorre Kimura    Exercises  Supine Active Straight Leg Raise - 1 x daily - 7 x weekly - 2 sets - 10 reps - use red band hold  Sidelying Hip Abduction with Resistance at Ankle - 1 x daily - 7 x weekly - 2 sets - 10 reps - band above knee if needed hold  Sidelying Hip Adduction with Ankle Weight - Leg Behind - 1 x daily - 7 x weekly - 2 sets - 10 " reps  Prone Hip Extension with Resistance Loop - 1 x daily - 7 x weekly - 2 sets - 10 reps  Standing Hip Extension with Anchored Resistance - 1 x daily - 7 x weekly - 2 sets - 10 reps  Standing Hip Flexion with Resistance Loop - 1 x daily - 7 x weekly - 2 sets - 10 reps  Standing Terminal Knee Extension with Resistance - 1 x daily - 7 x weekly - 2 sets - 10 reps  Single Leg Stance - 1 x daily - 7 x weekly - 2 sets - keep arch up 20 seconds hold  Supine Hamstring Stretch - 1 x daily - 7 x weekly - 1m hold  Step Up - 1 x daily - 7 x weekly - 2 sets - 10 reps      Goals  Plan Goals: STGs 2 weeks:  1. Pt to follow HEP and gym program with no issue  2. Swelling to resolve and lateral knee pain to resolve  3. Incision to close and heal without complication  4. Pt obtain insoles and show control of recurvatum indep of cueing  LTGs 4 weeks:  1. Pt to resume basketball per ortho guidelines  2. Pt to be indep in advance balance and conditioning ex  3. Pt to state back to full work week with no issue  4. LEFS score to improve from 38 to > 60/80    Plan  Therapy options: will be seen for skilled therapy services  Planned modality interventions: cryotherapy and electrical stimulation/Russian stimulation  Planned therapy interventions: balance/weight-bearing training, neuromuscular re-education, postural training, gait training, functional ROM exercises, strengthening, stretching, therapeutic activities and home exercise program  Frequency: 2x week  Duration in weeks: 12  Treatment plan discussed with: patient  Plan details: Should do well with 2x/week x 4 week plan        History # of Personal Factors and/or Comorbidities: MODERATE (1-2)  Examination of Body System(s): # of elements: LOW (1-2)  Clinical Presentation: STABLE   Clinical Decision Making: MODERATE      Timed:         Manual Therapy:         mins  76765;     Therapeutic Exercise:    15     mins  13058;     Neuromuscular Gladis:        mins  86108;    Therapeutic  Activity:     15     mins  75404;     Gait Training:           mins  35720;     Ultrasound:          mins  31182;    Ionto                                   mins   09363  Canalith Repos         mins 05077      Un-Timed:  Electrical Stimulation:         mins  84671 ( );  Dry Needling          mins  73362/30303  Traction          mins  34288  Low Eval          Mins  65978  Mod Eval     30     Mins  06880  High Eval                            Mins  48545        Timed Treatment:   30   mins   Total Treatment:     60   mins          PT: Zorre Zeno Kimura, PT, DPT     License Number:  [id number]     [place]:  [id number]    Electronically signed by Zorre Zeno Kimura, PT, 04/27/22, 11:23 AM EDT    Certification Period: 4/27/2022 thru 7/25/2022  I certify that the therapy services are furnished while this patient is under my care.  The services outlined above are required by this patient, and will be reviewed every 90 days.         Physician Signature:__________________________________________________    PHYSICIAN: Destiny Wilson MD      DATE:     Please sign and return via fax to [email or web address] . Thank you, [facility].

## 2022-05-02 ENCOUNTER — TREATMENT (OUTPATIENT)
Dept: PHYSICAL THERAPY | Facility: CLINIC | Age: 22
End: 2022-05-02

## 2022-05-02 DIAGNOSIS — Z98.890 S/P ARTHROSCOPIC PARTIAL MEDIAL MENISCECTOMY: Primary | ICD-10-CM

## 2022-05-02 DIAGNOSIS — Z47.89 ORTHOPEDIC AFTERCARE: ICD-10-CM

## 2022-05-02 PROCEDURE — 97110 THERAPEUTIC EXERCISES: CPT | Performed by: PHYSICAL THERAPIST

## 2022-05-02 NOTE — PROGRESS NOTES
Physical Therapy Daily Progress Note      Visit # 2      Subjective Evaluation    History of Present Illness    Subjective comment: Pt reports current pain of 6/10.  It is throbbing by the end of his work day.       Objective   See Exercise, Manual, and Modality Logs for complete treatment.   Added airdyne, RDL, and marching    Assessment & Plan     Assessment    Assessment details: Pt tolerated treatment with no c/o pain in (L) knee.  Pt was able to progress reps in his strengthening exercises with no issue. Added airdyne to build strength and increase blood flow to knee.   Continue to progress as tolerated.                     Manual Therapy:    0     mins  81813;  Therapeutic Exercise:    45     mins  50485;     Neuromuscular Gladis:    0    mins  62264;    Therapeutic Activity:     0     mins  81895;     Gait Trainin     mins  13474;     Ultrasound:     0     mins  28993;    Work Hardening           0      mins 28753  Iontophoresis               0   mins 65777  E-Stim                          _0_ mins 21143 ( )    Timed Treatment:   45   mins   Total Treatment:     45   mins    Satish Perez PTA  Physical Therapist Assistant

## 2022-05-06 ENCOUNTER — TREATMENT (OUTPATIENT)
Dept: PHYSICAL THERAPY | Facility: CLINIC | Age: 22
End: 2022-05-06

## 2022-05-06 DIAGNOSIS — Z47.89 ORTHOPEDIC AFTERCARE: ICD-10-CM

## 2022-05-06 DIAGNOSIS — Z98.890 S/P ARTHROSCOPIC PARTIAL MEDIAL MENISCECTOMY: Primary | ICD-10-CM

## 2022-05-06 PROCEDURE — 97110 THERAPEUTIC EXERCISES: CPT | Performed by: PHYSICAL THERAPIST

## 2022-05-06 NOTE — PROGRESS NOTES
Physical Therapy Daily Progress Note      Visit # 3      Subjective Evaluation    History of Present Illness    Subjective comment: Pt reports current pain level of 6/10.  Pt had been using ice on his (R) knee seveal times a day, but is trying to wean himself from using ice.       Objective   See Exercise, Manual, and Modality Logs for complete treatment.       Assessment & Plan     Assessment    Assessment details: Pt completed treatment with minimal c/o pain in (R).  Pt noted during step ups that he felt bone on bone.  Pt has trouble performing hip flexion/extension, instead performs lumbar flexion/extension.  Plan to defer hip flex/ext for now.                       Manual Therapy:    0     mins  80455;  Therapeutic Exercise:    38     mins  06267;     Neuromuscular Gladis:    0    mins  17828;    Therapeutic Activity:     0     mins  42448;     Gait Trainin     mins  32622;     Ultrasound:     0     mins  07010;    Work Hardening           0      mins 82830  Iontophoresis               00   mins 82218  E-Stim                          _0_ mins 82651 ( )    Timed Treatment:   38   mins   Total Treatment:     38   mins    Satish Perez PTA  Physical Therapist Assistant

## 2022-05-09 ENCOUNTER — TREATMENT (OUTPATIENT)
Dept: PHYSICAL THERAPY | Facility: CLINIC | Age: 22
End: 2022-05-09

## 2022-05-09 DIAGNOSIS — Z47.89 ORTHOPEDIC AFTERCARE: ICD-10-CM

## 2022-05-09 DIAGNOSIS — Z98.890 S/P ARTHROSCOPIC PARTIAL MEDIAL MENISCECTOMY: Primary | ICD-10-CM

## 2022-05-09 PROCEDURE — 97110 THERAPEUTIC EXERCISES: CPT | Performed by: PHYSICAL THERAPIST

## 2022-05-09 NOTE — PROGRESS NOTES
"Physical Therapy Daily Progress Note      Visit # 4      Subjective Evaluation    History of Present Illness    Subjective comment: Pt reports that he has no knee pain.       Objective   See Exercise, Manual, and Modality Logs for complete treatment.   Added Lateral step ups , Seated hs curl, Standing Heel raise, mini squatss    Assessment & Plan     Assessment    Assessment details: Pt completed treatment with no c/o pain in (R) knee.  Pt had no issue with introduction of ne open and closed chain exercises.  Pt was able to progress to 6\" step for step ups.  Continue to progress as tolerated.                     Manual Therapy:    0     mins  86996;  Therapeutic Exercise:    45     mins  72216;     Neuromuscular Gladis:    0    mins  55853;    Therapeutic Activity:     0     mins  77374;     Gait Trainin     mins  45037;     Ultrasound:     0     mins  20348;    Work Hardening           00      mins 25168  Iontophoresis               0   mins 68819  E-Stim                          _0_ mins 44127 ( )    Timed Treatment:   45   mins   Total Treatment:     45   mins    Satish Perez PTA  Physical Therapist Assistant  "

## 2022-05-13 ENCOUNTER — TREATMENT (OUTPATIENT)
Dept: PHYSICAL THERAPY | Facility: CLINIC | Age: 22
End: 2022-05-13

## 2022-05-13 DIAGNOSIS — Z47.89 ORTHOPEDIC AFTERCARE: ICD-10-CM

## 2022-05-13 DIAGNOSIS — Z98.890 S/P ARTHROSCOPIC PARTIAL MEDIAL MENISCECTOMY: Primary | ICD-10-CM

## 2022-05-13 PROCEDURE — 97110 THERAPEUTIC EXERCISES: CPT | Performed by: PHYSICAL THERAPIST

## 2022-05-13 NOTE — PROGRESS NOTES
Physical Therapy Daily Progress Note      Visit # 5      Subjective Evaluation    History of Present Illness    Subjective comment: Pt reports that he has no pain in (R) kneee when he gets up in the morning, but by the end of the day his knee aches.       Objective   See Exercise, Manual, and Modality Logs for complete treatment.   Added standing hip abd    Assessment & Plan     Assessment    Assessment details: Pt tolerated treatment with no c/o pain in (R) knee.  Pt benefited from vc to maintain upright posture and to use controled movements when performing standing hip abd.  Continue to progress as tolerated.                     Manual Therapy:    0     mins  61531;  Therapeutic Exercise:    41     mins  65254;     Neuromuscular Gladis:    0    mins  71981;    Therapeutic Activity:     0     mins  38394;     Gait Trainin     mins  59737;     Ultrasound:     0     mins  45296;    Work Hardening           0      mins 30773  Iontophoresis               0   mins 39878  E-Stim                          _0_ mins 23829 ( )    Timed Treatment:   41   mins   Total Treatment:     41   mins    Satish Perez PTA  Physical Therapist Assistant

## 2022-05-16 ENCOUNTER — TREATMENT (OUTPATIENT)
Dept: PHYSICAL THERAPY | Facility: CLINIC | Age: 22
End: 2022-05-16

## 2022-05-16 DIAGNOSIS — Z98.890 S/P ARTHROSCOPIC PARTIAL MEDIAL MENISCECTOMY: Primary | ICD-10-CM

## 2022-05-16 PROCEDURE — 97110 THERAPEUTIC EXERCISES: CPT | Performed by: PHYSICAL THERAPIST

## 2022-05-16 NOTE — PROGRESS NOTES
Physical Therapy Daily Treatment Note      Patient: Marjorie Bailey   : 2000  Referring practitioner: Destiny Wilson MD  Date of Initial Visit: Type: THERAPY  Noted: 2022  Today's Date: 2022  Patient seen for 6 sessions       Visit Diagnoses:    ICD-10-CM ICD-9-CM   1. S/P arthroscopic partial medial meniscectomy  Z98.890 V45.89         Subjective Patient reports that the knee is coming along; reports that the pain increases as the day goes on.      Objective   See Exercise, Manual, and Modality Logs for complete treatment.       Assessment/Plan  Subjective reports are good with regard to pain.  Progressed the open and closed chain strengthening exercises today to include isometric holds with SLR's, bridges, LAQ, split squats and step matrix.  Patient tolerated the progression of exercises without reports of pain in the right knee.      Timed:         Manual Therapy:    0     mins  67319;     Therapeutic Exercise:    31     mins  96399;     Neuromuscular Gladis:    0    mins  80721;    Therapeutic Activity:     0     mins  38151;     Gait Trainin     mins  01188;     Ultrasound:     0     mins  38451;    Work Conditioning      __0_  mins  48635      Un-Timed:  Electrical Stimulation:    0     mins  63295 ( );        Timed Treatment:   31   mins   Total Treatment:     31   mins    Rosendo Draper, PT  [place] License: [id number]

## 2022-05-20 ENCOUNTER — TREATMENT (OUTPATIENT)
Dept: PHYSICAL THERAPY | Facility: CLINIC | Age: 22
End: 2022-05-20

## 2022-05-20 DIAGNOSIS — Z47.89 ORTHOPEDIC AFTERCARE: ICD-10-CM

## 2022-05-20 DIAGNOSIS — Z98.890 S/P ARTHROSCOPIC PARTIAL MEDIAL MENISCECTOMY: Primary | ICD-10-CM

## 2022-05-20 PROCEDURE — 97110 THERAPEUTIC EXERCISES: CPT | Performed by: PHYSICAL THERAPIST

## 2022-05-20 PROCEDURE — 97530 THERAPEUTIC ACTIVITIES: CPT | Performed by: PHYSICAL THERAPIST

## 2022-05-20 NOTE — PROGRESS NOTES
"Physical Therapy Daily Progress Note      Visit # 7      Subjective Evaluation    History of Present Illness    Subjective comment: Pt reports that his (R) knee \"feels good\". Denies any pain.       Objective   See Exercise, Manual, and Modality Logs for complete treatment.   Added SLS on Airex, Step ups    Assessment & Plan     Assessment    Assessment details: Pt tolerated treatment with no c/o pain in (R) knee.  Pt was able to progress reps on most open chain exercises.  Pt had no issue with addition of SLS on airex and step ups.                     Manual Therapy:    0     mins  14924;  Therapeutic Exercise:    24     mins  81556;     Neuromuscular Gladis:    0    mins  97255;    Therapeutic Activity:     19     mins  00112;     Gait Trainin     mins  53561;     Ultrasound:     0     mins  37782;    Work Hardening           0      mins 10816  Iontophoresis               0   mins 23266  E-Stim                          _0_ mins 33759 ( )    Timed Treatment:   43   mins   Total Treatment:     43   mins    Satish Perez PTA  Physical Therapist Assistant  "

## 2022-06-01 ENCOUNTER — TREATMENT (OUTPATIENT)
Dept: PHYSICAL THERAPY | Facility: CLINIC | Age: 22
End: 2022-06-01

## 2022-06-01 DIAGNOSIS — Z47.89 ORTHOPEDIC AFTERCARE: ICD-10-CM

## 2022-06-01 DIAGNOSIS — Z98.890 S/P ARTHROSCOPIC PARTIAL MEDIAL MENISCECTOMY: Primary | ICD-10-CM

## 2022-06-01 PROCEDURE — 97110 THERAPEUTIC EXERCISES: CPT | Performed by: PHYSICAL THERAPIST

## 2022-06-01 PROCEDURE — 97530 THERAPEUTIC ACTIVITIES: CPT | Performed by: PHYSICAL THERAPIST

## 2022-06-01 NOTE — PROGRESS NOTES
"Physical Therapy Daily Progress Note      Visit # 8      Subjective Evaluation    History of Present Illness    Subjective comment: Pt reports (R) knee is \"progressively getting better\".       Objective   See Exercise, Manual, and Modality Logs for complete treatment.   Added heel taps     Assessment & Plan     Assessment    Assessment details: Pt completed treatment with no c/o pain in (R) knee.  Pt had no issue with introduction of heel taps. Pt benefits from vc to stay on task.  Continue to progress as tolerated.                     Manual Therapy:    0     mins  84394;  Therapeutic Exercise:    30     mins  38174;     Neuromuscular Gladis:    0    mins  18588;    Therapeutic Activity:     19     mins  20967;     Gait Trainin     mins  31840;     Ultrasound:     0     mins  97538;    Work Hardening           0      mins 42016  Iontophoresis               0   mins 24790  E-Stim                          _0_ mins 33301 ( )    Timed Treatment:   49   mins   Total Treatment:     49   mins    Satish Perez PTA  Physical Therapist Assistant  "

## 2022-06-03 ENCOUNTER — TREATMENT (OUTPATIENT)
Dept: PHYSICAL THERAPY | Facility: CLINIC | Age: 22
End: 2022-06-03

## 2022-06-03 DIAGNOSIS — Z47.89 ORTHOPEDIC AFTERCARE: ICD-10-CM

## 2022-06-03 DIAGNOSIS — Z98.890 S/P ARTHROSCOPIC PARTIAL MEDIAL MENISCECTOMY: Primary | ICD-10-CM

## 2022-06-03 PROCEDURE — 97110 THERAPEUTIC EXERCISES: CPT | Performed by: PHYSICAL THERAPIST

## 2022-06-03 PROCEDURE — 97530 THERAPEUTIC ACTIVITIES: CPT | Performed by: PHYSICAL THERAPIST

## 2022-06-03 NOTE — PROGRESS NOTES
"Physical Therapy Daily Progress Note      Visit # 9      Subjective Evaluation    History of Present Illness    Subjective comment: Pt reports he has \"just my usual little pain\" today.       Objective   See Exercise, Manual, and Modality Logs for complete treatment.       Assessment & Plan     Assessment    Assessment details: Pt completed treatment with no increase in pain in (R) knee.  Pt tolerated the progression of reps on strengthening exercises with no issue.  Continue to progress as tolerated.                     Manual Therapy:   0    mins  98219;  Therapeutic Exercise:    25     mins  65183;     Neuromuscular Gladis:    0    mins  81782;    Therapeutic Activity:     10     mins  37585;     Gait Trainin     mins  57859;     Ultrasound:     0     mins  19313;    Work Hardening           0      mins 63211  Iontophoresis               0   mins 14563  E-Stim                          _0_ mins 59424 ( )    Timed Treatment:   35   mins   Total Treatment:     35   mins    Satish Perez PTA  Physical Therapist Assistant  "

## 2022-06-07 NOTE — PROGRESS NOTES
Knee Scope follow Up       Patient: Marjorie Bailey        YOB: 2000      Chief Complaints: knee pain right      History of Present Illness: Pt is here f/u knee arthroscopy right knee he states is doing great he is progressing back to his activities he is continue physical therapy overall very happy      Allergies: No Known Allergies    Medications:   Home Medications:  Current Outpatient Medications on File Prior to Visit   Medication Sig   • HYDROcodone-acetaminophen (NORCO) 5-325 MG per tablet Take 1 tablet by mouth Every 6 (Six) Hours As Needed for Severe Pain .   • ibuprofen (ADVIL,MOTRIN) 800 MG tablet Take 1 tablet by mouth Every 6 (Six) Hours As Needed for Mild Pain .     No current facility-administered medications on file prior to visit.     Current Medications:  Scheduled Meds:  Continuous Infusions:No current facility-administered medications for this visit.    PRN Meds:.          Physical Exam: 21 y.o. male  General Appearance:    Alert, cooperative, in no acute distress                 There were no vitals filed for this visit.   Patient is alert and oriented ×3 no acute distress normal mood physical exam.  Physical exam of the knee, incisions looked good there is no erythema, calf is soft and non-tender.  No sign or sx of DVT      Assessment  S/P knee scope.  Overall doing well.         Plan: Continue with strengthening, progression of activities as long as he is doing well he can follow-up as needed

## 2022-06-08 ENCOUNTER — TREATMENT (OUTPATIENT)
Dept: PHYSICAL THERAPY | Facility: CLINIC | Age: 22
End: 2022-06-08

## 2022-06-08 DIAGNOSIS — Z98.890 S/P ARTHROSCOPIC PARTIAL MEDIAL MENISCECTOMY: Primary | ICD-10-CM

## 2022-06-08 PROCEDURE — 97110 THERAPEUTIC EXERCISES: CPT | Performed by: PHYSICAL THERAPIST

## 2022-06-08 PROCEDURE — 97530 THERAPEUTIC ACTIVITIES: CPT | Performed by: PHYSICAL THERAPIST

## 2022-06-08 NOTE — PROGRESS NOTES
Physical Therapy Daily Treatment Note      Patient: Marjorie Bailey   : 2000  Referring practitioner: Destiny Wilson MD  Date of Initial Visit: Type: THERAPY  Noted: 2022  Today's Date: 2022  Patient seen for 10 sessions       Visit Diagnoses:    ICD-10-CM ICD-9-CM   1. S/P arthroscopic partial medial meniscectomy  Z98.890 V45.89         Subjective Patient reports that the knee is doing good, denies pain.  Reports that it bothers him if he is on it a lot.    Objective   See Exercise, Manual, and Modality Logs for complete treatment.       Assessment/Plan  Subjective reports and function continue to be much improved.  Patient was able to tolerate the addition of the single leg bridge and Malaysian squat without any c/o in the knee.  Good tolerance to the exercise routine.  Feel that the patient can continue with the HEP unless otherwise instructed by the MD tomorrow.      Timed:         Manual Therapy:    0     mins  76557;     Therapeutic Exercise:    24     mins  24806;     Neuromuscular Gladis:    0    mins  74164;    Therapeutic Activity:     8     mins  08060;     Gait Trainin     mins  47822;     Ultrasound:     0     mins  07456;    Work Conditioning      __0_  mins  83784      Un-Timed:  Electrical Stimulation:    0     mins  19503 ( );        Timed Treatment:   32   mins   Total Treatment:     32   mins    Rosendo Draper, PT  [place] License: [id number]

## 2022-06-09 ENCOUNTER — OFFICE VISIT (OUTPATIENT)
Dept: ORTHOPEDIC SURGERY | Facility: CLINIC | Age: 22
End: 2022-06-09

## 2022-06-09 VITALS — WEIGHT: 280.1 LBS | TEMPERATURE: 98.2 F | HEIGHT: 78 IN | BODY MASS INDEX: 32.41 KG/M2

## 2022-06-09 DIAGNOSIS — Z98.890 S/P ARTHROSCOPY OF KNEE: Primary | ICD-10-CM

## 2022-06-09 PROCEDURE — 99024 POSTOP FOLLOW-UP VISIT: CPT | Performed by: ORTHOPAEDIC SURGERY

## 2022-06-10 ENCOUNTER — TREATMENT (OUTPATIENT)
Dept: PHYSICAL THERAPY | Facility: CLINIC | Age: 22
End: 2022-06-10

## 2022-06-10 DIAGNOSIS — Z98.890 S/P ARTHROSCOPIC PARTIAL MEDIAL MENISCECTOMY: Primary | ICD-10-CM

## 2022-06-10 DIAGNOSIS — Z47.89 ORTHOPEDIC AFTERCARE: ICD-10-CM

## 2022-06-10 PROCEDURE — 97110 THERAPEUTIC EXERCISES: CPT | Performed by: PHYSICAL THERAPIST

## 2022-06-10 NOTE — PROGRESS NOTES
Physical Therapy Daily Progress Note      Visit # 11      Subjective Evaluation    History of Present Illness    Subjective comment: Pt denies any knee pain today.       Objective   See Exercise, Manual, and Modality Logs for complete treatment.       Assessment & Plan     Assessment    Assessment details: Pt completed treatment with  no c/o pain in (R) knee. Pt needed minimal vc to start his prescribed exercises.  Printed revised HEP and discussed with pt. Discussed general guidelines for return to gym workout with pt.  Pt has been discharged by his Dr, and today is last day of PT.                     Manual Therapy:    0     mins  55308;  Therapeutic Exercise:    43     mins  78046;     Neuromuscular Gladis:    0    mins  10117;    Therapeutic Activity:     0     mins  42607;     Gait Trainin     mins  96163;     Ultrasound:     0     mins  75929;    Work Hardening           0      mins 62303  Iontophoresis               0   mins 60492  E-Stim                          _0_ mins 26861 ( )    Timed Treatment:   43   mins   Total Treatment:     43   mins    Satish Perez PTA  Physical Therapist Assistant

## 2022-06-21 ENCOUNTER — DOCUMENTATION (OUTPATIENT)
Dept: PHYSICAL THERAPY | Facility: CLINIC | Age: 22
End: 2022-06-21

## 2022-06-21 NOTE — PROGRESS NOTES
Discharge Summary  Discharge Summary from Physical Therapy Report    Patient Information  Marjorie Bailey  2000    Dates  PT visit: 4/27 to 6/10/22  Number of Visits: 11     Discharge Status of Patient: See last daily note.    Goals: Partially Met    Visit Diagnoses:  No diagnosis found.    Discharge Plan: Continue with current home exercise program as instructed    Comments see above.    Date of Discharge 6/21/22        Rosendo Draper, PT  Physical Therapist  [place] License [id number]

## 2022-08-02 DIAGNOSIS — T30.0 BURN: Primary | ICD-10-CM

## 2022-08-08 ENCOUNTER — OFFICE VISIT (OUTPATIENT)
Dept: ORTHOPEDIC SURGERY | Facility: CLINIC | Age: 22
End: 2022-08-08

## 2022-08-08 VITALS — TEMPERATURE: 96.9 F | BODY MASS INDEX: 37.31 KG/M2 | WEIGHT: 275.5 LBS | HEIGHT: 72 IN

## 2022-08-08 DIAGNOSIS — T30.0 BURN: Primary | ICD-10-CM

## 2022-08-08 PROCEDURE — 99213 OFFICE O/P EST LOW 20 MIN: CPT | Performed by: ORTHOPAEDIC SURGERY

## 2022-08-08 NOTE — PROGRESS NOTES
Patient: Marjorie Bailey  YOB: 2000  Date of Service: 8/8/2022    Chief Complaints: Right knee pain    Subjective:    History of Present Illness: Pt is seen in the office today with complaints of right knee pain he is status post knee arthroscopy and the knee is doing good but he fell asleep with ice on it and got essentially a frostbite burn I called him some Silvadene cream meant but he states they did not have it.  He states his knee is doing better it still a little sore but much better the overlying skin has come off its dry not draining not red.          Allergies: No Known Allergies    Medications:   Home Medications:  Current Outpatient Medications on File Prior to Visit   Medication Sig   • HYDROcodone-acetaminophen (NORCO) 5-325 MG per tablet Take 1 tablet by mouth Every 6 (Six) Hours As Needed for Severe Pain .   • ibuprofen (ADVIL,MOTRIN) 800 MG tablet Take 1 tablet by mouth Every 6 (Six) Hours As Needed for Mild Pain .     Current Facility-Administered Medications on File Prior to Visit   Medication   • silver sulfadiazine (SILVADENE, SSD) 1 % cream 1 application     Current Medications:  Scheduled Meds:silver sulfadiazine, 1 application, Topical, Q12H      Continuous Infusions:   PRN Meds:.    I have reviewed the patient's medical history in detail and updated the computerized patient record.  Review and summarization of old records include:    Past Medical History:   Diagnosis Date   • Allergies    • Asthma     AS A CHILD   • Right knee meniscal tear         Past Surgical History:   Procedure Laterality Date   • KNEE ARTHROSCOPY Right 4/11/2022    Procedure: KNEE ARTHROSCOPY  PARTIAL MEDIAL MENISECTOMY;  Surgeon: Destiny Wilson MD;  Location: [facility] OR [facility] [place];  Service: Orthopedics;  Laterality: Right;   • NO PAST SURGERIES          Social History     Occupational History   • Not on file   Tobacco Use   • Smoking status: Current Every Day Smoker     Packs/day: 0.25     Types:  Cigarettes   • Smokeless tobacco: Never Used   • Tobacco comment: black and mild   Vaping Use   • Vaping Use: Never used   Substance and Sexual Activity   • Alcohol use: Not Currently     Comment: OCCASIONAL   • Drug use: Yes     Types: Marijuana     Comment: OCCASIONAL   • Sexual activity: Defer      Social History     Social History Narrative   • Not on file        Family History   Problem Relation Age of Onset   • Heart disease Father    • Sickle cell anemia Sister    • Sickle cell anemia Sister    • Malig Hyperthermia Neg Hx        ROS: 14 point review of systems was performed and was negative except for documented findings in HPI and today's encounter.     Allergies: No Known Allergies  Constitutional:  Denies fever, shaking or chills   Eyes:  Denies change in visual acuity   HENT:  Denies nasal congestion or sore throat   Respiratory:  Denies cough or shortness of breath   Cardiovascular:  Denies chest pain or severe LE edema   GI:  Denies abdominal pain, nausea, vomiting, bloody stools or diarrhea   Musculoskeletal:  Numbness, tingling, or loss of motor function only as noted above in history of present illness.  : Denies painful urination or hematuria  Integument:  Denies rash, lesion or ulceration   Neurologic:  Denies headache or focal weakness  Endocrine:  Denies lymphadenopathy  Psych:  Denies confusion or change in mental status   Hem:  Denies active bleeding      Physical Exam: 22 y.o. male  Wt Readings from Last 3 Encounters:   06/09/22 127 kg (280 lb 1.6 oz)   04/25/22 127 kg (279 lb)   03/28/22 124 kg (273 lb 9.6 oz)       There is no height or weight on file to calculate BMI.  No height and weight on file for this encounter.  There were no vitals filed for this visit.  Vital signs reviewed.   General Appearance:    Alert, cooperative, in no acute distress                    Ortho exam    Exam of his right knee he does have 2 large burns just inferior medial to his knee the overlying skin is gone  but the bed looks fine it is not red not swollen         .time    Assessment: Status post knee arthroscopy with frostbite burn the skin looks fine he will have a scar there he is lost the overlying layer I will give him some Silvadene to use daily and then we will recheck him in 1 month    Plan: Plan is as above  Follow up as indicated.  Asad Wilson M.D.

## 2024-03-13 NOTE — PROGRESS NOTES
New Knee      Patient: Marjorie Bailey        YOB: 2000    Medical Record Number: [id number]        Chief Complaints: Left knee pain      History of Present Illness: This is a 23-year-old male who I have seen in the past for right knee meniscus tear and arthroscopy reports complaining of left knee pain that began the end of January that was not 1 particular injury or event but just started having pain in his left knee it is medial and anterior again no particular event or injury he is a         Allergies: No Known Allergies    Medications:   Home Medications:  Current Outpatient Medications on File Prior to Visit   Medication Sig    [DISCONTINUED] HYDROcodone-acetaminophen (NORCO) 5-325 MG per tablet Take 1 tablet by mouth Every 6 (Six) Hours As Needed for Severe Pain .    [DISCONTINUED] ibuprofen (ADVIL,MOTRIN) 800 MG tablet Take 1 tablet by mouth Every 6 (Six) Hours As Needed for Mild Pain .     No current facility-administered medications on file prior to visit.     Current Medications:  Scheduled Meds:  Continuous Infusions:No current facility-administered medications for this visit.    PRN Meds:.    Past Medical History:   Diagnosis Date    Allergies     Asthma     AS A CHILD    Right knee meniscal tear         Past Surgical History:   Procedure Laterality Date    KNEE ARTHROSCOPY Right 4/11/2022    Procedure: KNEE ARTHROSCOPY  PARTIAL MEDIAL MENISECTOMY;  Surgeon: Destiny Wilson MD;  Location: [facility] OR [facility] [place];  Service: Orthopedics;  Laterality: Right;    NO PAST SURGERIES          Social History     Occupational History    Not on file   Tobacco Use    Smoking status: Never    Smokeless tobacco: Never    Tobacco comments:     black and mild   Vaping Use    Vaping status: Never Used   Substance and Sexual Activity    Alcohol use: Not Currently     Comment: OCCASIONAL    Drug use: Yes     Types: Marijuana     Comment: OCCASIONAL    Sexual activity: Defer      Social History  "    Social History Narrative    Not on file        Family History   Problem Relation Age of Onset    Heart disease Father     Sickle cell anemia Sister     Sickle cell anemia Sister     Malig Hyperthermia Neg Hx              Review of Systems:     Review of Systems      Physical Exam: 23 y.o. male  General Appearance:    Alert, cooperative, in no acute distress                   Vitals:    03/14/24 1324   Temp: 98.3 °F (36.8 °C)   TempSrc: Temporal   Weight: 127 kg (280 lb 4.8 oz)   Height: 182.9 cm (72\")   PainSc:   2      Patient is alert and read ×3 no acute distress appears her above-listed at height weight and age.  Affect is normal respiratory rate is normal unlabored. Heart rate regular rate rhythm, sclera, dentition and hearing are normal for the purpose of this exam.        Ortho Exam  Exam of the left knee he has no effusion no redness he has some mild tenderness medially negative bounce home negative Antionette and a stable Lumenis exam calf is soft and nontender quads are reasonable  Procedures             Radiology:   AP, Lateral and merchant views of the left knee  were ordered/reviewed to evauateknee pain.  I have no comparative films he may be has some very mild narrowing of the left medial compartment compared to the right if so it is only a millimeter or 2 he has some mild patellofemoral OA but nothing that looks horrible.  Imaging Results (Most Recent)       Procedure Component Value Units Date/Time    XR Knee 3 View Left [[id number]] Resulted: 03/14/24 1316     Updated: 03/14/24 1316    Impression:      Ordering physician's impression is located in the Encounter Note dated 03/14/24. X-ray performed in the DR room.            Assessment/Plan:        Left knee pain he could have a meniscus tear but it would be unlikely in somebody his age without a defining event we talked about options including injection he is actually getting better we will show him a brace having get back on anti-inflammatories " and exercises if his symptoms persist we can do an MRI

## 2024-03-14 ENCOUNTER — OFFICE VISIT (OUTPATIENT)
Dept: ORTHOPEDIC SURGERY | Facility: CLINIC | Age: 24
End: 2024-03-14
Payer: COMMERCIAL

## 2024-03-14 VITALS — WEIGHT: 280.3 LBS | TEMPERATURE: 98.3 F | BODY MASS INDEX: 37.96 KG/M2 | HEIGHT: 72 IN

## 2024-03-14 DIAGNOSIS — M25.562 LEFT KNEE PAIN, UNSPECIFIED CHRONICITY: Primary | ICD-10-CM

## 2024-03-14 PROCEDURE — 99213 OFFICE O/P EST LOW 20 MIN: CPT | Performed by: ORTHOPAEDIC SURGERY

## (undated) DEVICE — PK ARTHSCP 40

## (undated) DEVICE — ABL APOLLO RF M/PRT 50D

## (undated) DEVICE — SKIN PREP TRAY W/CHG: Brand: MEDLINE INDUSTRIES, INC.

## (undated) DEVICE — BLD DISSCT COOL CUT SJ CRVD 4MM 13CM

## (undated) DEVICE — DISPOSABLE TOURNIQUET CUFF SINGLE BLADDER, SINGLE PORT AND QUICK CONNECT CONNECTOR: Brand: COLOR CUFF

## (undated) DEVICE — GLV SURG BIOGEL LTX PF 6 1/2

## (undated) DEVICE — SUT ETHLN 3/0 PS1 18IN 1663H

## (undated) DEVICE — TBG ARTHSCP PUMP W CONN/REDUC 8FT

## (undated) DEVICE — UNDERCAST PADDING: Brand: DEROYAL

## (undated) DEVICE — BNDG ELAS ELITE V/CLOSE 4IN 5YD LF STRL

## (undated) DEVICE — DRSNG WND GZ CURAD OIL EMULSION 3X3IN STRL

## (undated) DEVICE — TBG ARTHSCP PT W CONN/REDUC 8FT